# Patient Record
Sex: FEMALE | URBAN - METROPOLITAN AREA
[De-identification: names, ages, dates, MRNs, and addresses within clinical notes are randomized per-mention and may not be internally consistent; named-entity substitution may affect disease eponyms.]

---

## 2020-12-08 ENCOUNTER — OFFICE VISIT (OUTPATIENT)
Dept: OPHTHALMOLOGY | Facility: CLINIC | Age: 65
End: 2020-12-08
Attending: OPHTHALMOLOGY
Payer: COMMERCIAL

## 2020-12-08 DIAGNOSIS — C69.31 MALIGNANT MELANOMA OF CHOROID OF RIGHT EYE (H): ICD-10-CM

## 2020-12-08 DIAGNOSIS — C69.31 MALIGNANT MELANOMA OF CHOROID OF RIGHT EYE (H): Primary | ICD-10-CM

## 2020-12-08 DIAGNOSIS — H35.3221 EXUDATIVE AGE-RELATED MACULAR DEGENERATION OF LEFT EYE WITH ACTIVE CHOROIDAL NEOVASCULARIZATION (H): Primary | ICD-10-CM

## 2020-12-08 PROCEDURE — 92250 FUNDUS PHOTOGRAPHY W/I&R: CPT | Performed by: OPHTHALMOLOGY

## 2020-12-08 PROCEDURE — 92242 FLUORESCEIN&ICG ANGIOGRAPHY: CPT | Performed by: OPHTHALMOLOGY

## 2020-12-08 PROCEDURE — 92004 COMPRE OPH EXAM NEW PT 1/>: CPT | Performed by: OPHTHALMOLOGY

## 2020-12-08 PROCEDURE — 92235 FLUORESCEIN ANGRPH MLTIFRAME: CPT | Performed by: OPHTHALMOLOGY

## 2020-12-08 PROCEDURE — G0463 HOSPITAL OUTPT CLINIC VISIT: HCPCS

## 2020-12-08 PROCEDURE — 99207 FUNDUS PHOTOS OU (BOTH EYES): CPT | Performed by: OPHTHALMOLOGY

## 2020-12-08 PROCEDURE — 76510 OPH US DX B-SCAN&QUAN A-SCAN: CPT | Performed by: OPHTHALMOLOGY

## 2020-12-08 PROCEDURE — 92242 FLUORESCEIN&ICG ANGIOGRAPHY: CPT | Mod: 26 | Performed by: OPHTHALMOLOGY

## 2020-12-08 PROCEDURE — 92134 CPTRZ OPH DX IMG PST SGM RTA: CPT | Performed by: OPHTHALMOLOGY

## 2020-12-08 ASSESSMENT — REFRACTION_WEARINGRX
OS_SPHERE: -2.50
OS_CYLINDER: SPHERE
OD_AXIS: 115
OD_CYLINDER: +1.00
OD_SPHERE: PLANO

## 2020-12-08 ASSESSMENT — EXTERNAL EXAM - LEFT EYE: OS_EXAM: NORMAL

## 2020-12-08 ASSESSMENT — VISUAL ACUITY
OS_CC: 20/20
METHOD: SNELLEN - LINEAR
CORRECTION_TYPE: GLASSES
OD_CC: 20/150

## 2020-12-08 ASSESSMENT — TONOMETRY
IOP_METHOD: TONOPEN
OD_IOP_MMHG: 12
OS_IOP_MMHG: 13

## 2020-12-08 ASSESSMENT — SLIT LAMP EXAM - LIDS
COMMENTS: NORMAL
COMMENTS: NORMAL

## 2020-12-08 ASSESSMENT — CUP TO DISC RATIO
OD_RATIO: 0.1
OS_RATIO: 0.1

## 2020-12-08 ASSESSMENT — EXTERNAL EXAM - RIGHT EYE: OD_EXAM: NORMAL

## 2020-12-08 ASSESSMENT — CONF VISUAL FIELD
OD_SUPERIOR_NASAL_RESTRICTION: 1
OS_NORMAL: 1

## 2020-12-08 NOTE — NURSING NOTE
Chief Complaints and History of Present Illnesses   Patient presents with     Malignant Melanoma (Choroid) Follow Up     Chief Complaint(s) and History of Present Illness(es)     Malignant Melanoma (Choroid) Follow Up     Laterality: right eye    Duration: 6 years              Comments     Pt. States that she is here for Melanoma f/u. Had been seeing Dr. Tipton at  but she has moved. Will be seeing a new Doctor at  but wants to be seen here until then.  No change in VA BE. No pain BE. No flashes or floaters BE.  Latanya Wolfe COT 1:22 PM December 8, 2020

## 2020-12-08 NOTE — PROGRESS NOTES
CC -   CMM OD    INTERVAL HISTORY - last seen by me in 2015.  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 She has been following with Dr Tipton from [facility] for  of the left eye. She is receiving intravitreal injections of Eylea in her right eyr for cystoid macular edema.    Borderline DM    PAST OCULAR SURGERY  I-125 OD 2000  CE/iOL OD    RETINAL IMAGING:  OCT 12-8-20  OD -  Severe atrophic scar, mild CME within scar, mild RPE elevations ?residual CMM  OS -  RPE elevation with mild SRF parafoveal and overlying outer atrophy, RPE outer irregular nasal macula      U/S OD  A-scan - no reliable A-scan  B-scan - thin scar, 1.31 x ? 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 CME   - severe CME 2011 with VA CF@1'   - s/p PST 2013, Lucentis, Avastin, Eylea   - avoiding steroids d/t  OS   - worse CME @ 12 weeks on 6/2018     - last injection Eylea 5/8/20 (7 months)   - OCT no sig fluid   - VA stable   - observe today   - recheck 1-2 months      # 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  I personally obtained the chief complaint(s) and history of present illness.  I confirmed and edited as necessary the review of systems, past medical/surgical history, family history, social history, and examination findings as documented by others; and I examined the patient myself.  I personally reviewed the relevant tests, images, and reports as documented above.  I formulated and edited as necessary the assessment and plan and discussed the findings and management plan with the patient and family    Sondra Reyes MD, PhD  , Vitreoretinal Surgery  Department of Ophthalmology  [facility]

## 2021-01-06 DIAGNOSIS — C69.31 MALIGNANT MELANOMA OF CHOROID OF RIGHT EYE (H): Primary | ICD-10-CM

## 2021-01-11 ENCOUNTER — OFFICE VISIT (OUTPATIENT)
Dept: OPHTHALMOLOGY | Facility: CLINIC | Age: 66
End: 2021-01-11
Attending: OPHTHALMOLOGY
Payer: COMMERCIAL

## 2021-01-11 DIAGNOSIS — H35.3221 EXUDATIVE AGE-RELATED MACULAR DEGENERATION OF LEFT EYE WITH ACTIVE CHOROIDAL NEOVASCULARIZATION (H): Primary | ICD-10-CM

## 2021-01-11 DIAGNOSIS — C69.31 MALIGNANT MELANOMA OF CHOROID OF RIGHT EYE (H): ICD-10-CM

## 2021-01-11 PROCEDURE — G0463 HOSPITAL OUTPT CLINIC VISIT: HCPCS

## 2021-01-11 PROCEDURE — 99213 OFFICE O/P EST LOW 20 MIN: CPT | Performed by: OPHTHALMOLOGY

## 2021-01-11 PROCEDURE — 92134 CPTRZ OPH DX IMG PST SGM RTA: CPT | Performed by: OPHTHALMOLOGY

## 2021-01-11 ASSESSMENT — EXTERNAL EXAM - LEFT EYE: OS_EXAM: NORMAL

## 2021-01-11 ASSESSMENT — SLIT LAMP EXAM - LIDS
COMMENTS: NORMAL
COMMENTS: NORMAL

## 2021-01-11 ASSESSMENT — VISUAL ACUITY
METHOD: SNELLEN - LINEAR
OS_CC: 20/20
CORRECTION_TYPE: GLASSES
OS_CC+: -1
OD_CC: 20/150

## 2021-01-11 ASSESSMENT — REFRACTION_WEARINGRX
OS_CYLINDER: SPHERE
OD_CYLINDER: +1.00
OD_SPHERE: PLANO
OS_SPHERE: -2.50
OD_AXIS: 115

## 2021-01-11 ASSESSMENT — TONOMETRY
IOP_METHOD: TONOPEN
OD_IOP_MMHG: 10
OS_IOP_MMHG: 11

## 2021-01-11 ASSESSMENT — CUP TO DISC RATIO
OD_RATIO: 0.1
OS_RATIO: 0.1

## 2021-01-11 ASSESSMENT — CONF VISUAL FIELD
OS_NORMAL: 1
OD_SUPERIOR_NASAL_RESTRICTION: 1

## 2021-01-11 ASSESSMENT — EXTERNAL EXAM - RIGHT EYE: OD_EXAM: NORMAL

## 2021-01-11 NOTE — PROGRESS NOTES
CC -   CMM OD / PCV OS    INTERVAL HISTORY - LV with me 12/8/20.  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 She has been following with Dr Tipton from [facility] for  of the left eye. She is receiving intravitreal injections of Eylea OD for radiation retinopathy/CME     Borderline DM    PAST OCULAR SURGERY  I-125 OD 2000  CE/iOL OD    RETINAL IMAGING:  OCT 1-11-21  OD -  Severe atrophic scar, mild CME within scar, mild RPE elevations ?residual CMM  OS -  RPE elevation with mild SRF parafoveal and overlying outer atrophy, RPE outer irregular nasal macula      U/S OD  A-scan - no reliable A-scan  B-scan - thin scar, 1.31 x ? 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   - OCT-A & ICG 12/8/20 c/w polyp   - mild SRF nasal macula - noted 1st visit with me 12/2020   - looks chronic    - per notes has come & gone in past with Saeed, noted 5/2020   - continue to observe closely for now     - recheck 4-6 weeks,     - will check Eylea & PDT coverage      # CMM OD   - s/p I-125 2000   - likely inactive   - last U/S 12/2020 @ [facility]    - repeat every 1-2 years   - minimal residual elevation, atrophic scar      # Radiation retinopathy OD with CME   - severe CME 2011 with [facility] CF@1'   - s/p PST 2013, [employer]entis, Avastin, Eylea   - avoiding steroids d/t  OS   - worse CME @ 12 weeks on 6/2018     - last injection Eylea 5/8/20   - OCT no sig fluid   - VA stable   - observe today   - recheck few months            #  OS   - mild fluid, choroid thick for age   - doubt CNVM      # PVD OU   - advised S/Sx RD 12/2020      #  borderline DM no DR          return to clinic:  4-6 weeks  OCT OU, DFE OS    ATTESTATION     Attending Attestation:     Complete documentation of historical and exam elements from today's encounter can be found in the full encounter summary report (not reduplicated in this progress note).  I personally obtained the chief complaint(s) and history of present illness.  I confirmed and edited as necessary the review of systems, past medical/surgical history, family history, social history, and examination findings as documented by others; and I examined the patient myself.  I personally reviewed the relevant tests, images, and reports as documented above.  I formulated and edited as necessary the assessment and plan and discussed the findings and management plan with the patient and family    Sondra Reyes MD, PhD  , Vitreoretinal Surgery  Department of Ophthalmology  [facility]

## 2021-01-11 NOTE — NURSING NOTE
Chief Complaints and History of Present Illnesses   Patient presents with     Macular Degeneration Follow Up     Chief Complaint(s) and History of Present Illness(es)     Macular Degeneration Follow Up     Laterality: both eyes    Onset: 1 month ago              Comments     Pt. States that she is doing well. No change in VA BE. No pain BE. No flashes or floaters BE.   Latanya Wolfe COT 7:45 AM January 11, 2021

## 2021-02-01 DIAGNOSIS — H35.3221 EXUDATIVE AGE-RELATED MACULAR DEGENERATION OF LEFT EYE WITH ACTIVE CHOROIDAL NEOVASCULARIZATION (H): Primary | ICD-10-CM

## 2021-02-08 ENCOUNTER — OFFICE VISIT (OUTPATIENT)
Dept: OPHTHALMOLOGY | Facility: CLINIC | Age: 66
End: 2021-02-08
Attending: OPHTHALMOLOGY
Payer: COMMERCIAL

## 2021-02-08 DIAGNOSIS — H35.3221 EXUDATIVE AGE-RELATED MACULAR DEGENERATION OF LEFT EYE WITH ACTIVE CHOROIDAL NEOVASCULARIZATION (H): ICD-10-CM

## 2021-02-08 PROCEDURE — 99213 OFFICE O/P EST LOW 20 MIN: CPT | Performed by: OPHTHALMOLOGY

## 2021-02-08 PROCEDURE — G0463 HOSPITAL OUTPT CLINIC VISIT: HCPCS

## 2021-02-08 PROCEDURE — 92134 CPTRZ OPH DX IMG PST SGM RTA: CPT | Performed by: OPHTHALMOLOGY

## 2021-02-08 ASSESSMENT — TONOMETRY
IOP_METHOD: TONOPEN
OD_IOP_MMHG: 13
OS_IOP_MMHG: 13

## 2021-02-08 ASSESSMENT — CONF VISUAL FIELD
OD_SUPERIOR_NASAL_RESTRICTION: 1
OS_NORMAL: 1
METHOD: COUNTING FINGERS

## 2021-02-08 ASSESSMENT — EXTERNAL EXAM - LEFT EYE: OS_EXAM: NORMAL

## 2021-02-08 ASSESSMENT — REFRACTION_WEARINGRX
OD_SPHERE: PLANO
OD_CYLINDER: +1.00
OS_CYLINDER: SPHERE
OD_AXIS: 115
OS_SPHERE: -2.50

## 2021-02-08 ASSESSMENT — VISUAL ACUITY
OD_CC+: +1
OD_CC: 20/150
CORRECTION_TYPE: GLASSES
OS_CC: 20/20
METHOD: SNELLEN - LINEAR
OS_CC+: -2

## 2021-02-08 ASSESSMENT — SLIT LAMP EXAM - LIDS
COMMENTS: NORMAL
COMMENTS: NORMAL

## 2021-02-08 ASSESSMENT — EXTERNAL EXAM - RIGHT EYE: OD_EXAM: NORMAL

## 2021-02-08 ASSESSMENT — CUP TO DISC RATIO
OS_RATIO: 0.1
OD_RATIO: 0.1

## 2021-02-08 NOTE — NURSING NOTE
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  Denies any flashes, floaters, pain, or irritation.  Ocular meds: None    Carole Govea OT 7:44 AM February 8, 2021

## 2021-02-08 NOTE — PROGRESS NOTES
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 She has been following with Dr Tipton from [facility] for  of the left eye. She is receiving intravitreal injections of Eylea OD for radiation retinopathy/CME     Borderline DM    PAST OCULAR SURGERY  I-125 OD 2000  CE/iOL OD    RETINAL IMAGING:  OCT 2-8-21  OD -  Severe atrophic scar, mild CME within scar, mild RPE elevations ?residual CMM  OS -  RPE elevation with mild SRF parafoveal and overlying outer atrophy, RPE outer irregular nasal macula      U/S OD  A-scan - no reliable A-scan  B-scan - thin scar, 1.31 x ? 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   - OCT-A & ICG 12/8/20 c/w polyp   - mild SRF nasal macula - noted 1st visit with me 12/2020   - looks chronic    - per notes has come & gone in past with Saeed, noted 5/2020   - continue to observe closely for now     - recheck 4-6 weeks,     - will check Eylea & PDT coverage      # CMM OD   - s/p I-125 2000   - likely inactive   - last U/S 12/2020 @ [facility]    - repeat every 1-2 years   - minimal residual elevation, atrophic scar      # Radiation retinopathy OD with CME   - severe CME 2011 with VA CF@1'   - s/p PST 2013, [employer], Avastin, Eylea   - avoiding steroids d/t  OS   - worse CME @ 12 weeks on 6/2018     - last injection Eylea 5/8/20   - OCT no sig fluid   - VA stable   - observe today   - recheck few months            #  OS   - mild fluid, choroid thick for age   - doubt CNVM      # PVD OU   - advised S/Sx RD 12/2020      # borderline DM no            return to clinic:  6 weeks, OCT OU, DFE OU    ATTESTATION     Attending Attestation:     Complete documentation of historical and exam elements from today's encounter can be found in the full encounter summary report (not reduplicated in this progress note).  I personally obtained the chief complaint(s) and history of present illness.  I confirmed and edited as necessary the review of systems, past medical/surgical history, family history, social history, and examination findings as documented by others; and I examined the patient myself.  I personally reviewed the relevant tests, images, and reports as documented above.  I formulated and edited as necessary the assessment and plan and discussed the findings and management plan with the patient and family    Sondra Reyes MD, PhD  , Vitreoretinal Surgery  Department of Ophthalmology  [facility]

## 2021-03-15 DIAGNOSIS — H35.3221 EXUDATIVE AGE-RELATED MACULAR DEGENERATION OF LEFT EYE WITH ACTIVE CHOROIDAL NEOVASCULARIZATION (H): Primary | ICD-10-CM

## 2021-03-22 ENCOUNTER — OFFICE VISIT (OUTPATIENT)
Dept: OPHTHALMOLOGY | Facility: CLINIC | Age: 66
End: 2021-03-22
Attending: OPHTHALMOLOGY
Payer: COMMERCIAL

## 2021-03-22 DIAGNOSIS — H35.3221 EXUDATIVE AGE-RELATED MACULAR DEGENERATION OF LEFT EYE WITH ACTIVE CHOROIDAL NEOVASCULARIZATION (H): ICD-10-CM

## 2021-03-22 PROCEDURE — 99213 OFFICE O/P EST LOW 20 MIN: CPT | Performed by: OPHTHALMOLOGY

## 2021-03-22 PROCEDURE — G0463 HOSPITAL OUTPT CLINIC VISIT: HCPCS

## 2021-03-22 PROCEDURE — 92134 CPTRZ OPH DX IMG PST SGM RTA: CPT | Performed by: OPHTHALMOLOGY

## 2021-03-22 ASSESSMENT — REFRACTION_WEARINGRX
OS_SPHERE: -2.50
OD_CYLINDER: +1.00
OS_CYLINDER: SPHERE
OD_SPHERE: PLANO
OD_AXIS: 115

## 2021-03-22 ASSESSMENT — CONF VISUAL FIELD
OS_NORMAL: 1
METHOD: COUNTING FINGERS
OD_SUPERIOR_NASAL_RESTRICTION: 1

## 2021-03-22 ASSESSMENT — SLIT LAMP EXAM - LIDS
COMMENTS: NORMAL
COMMENTS: NORMAL

## 2021-03-22 ASSESSMENT — TONOMETRY
IOP_METHOD: TONOPEN
OD_IOP_MMHG: 15
OS_IOP_MMHG: 14

## 2021-03-22 ASSESSMENT — EXTERNAL EXAM - RIGHT EYE: OD_EXAM: NORMAL

## 2021-03-22 ASSESSMENT — CUP TO DISC RATIO
OD_RATIO: 0.1
OS_RATIO: 0.1

## 2021-03-22 ASSESSMENT — VISUAL ACUITY
OS_CC: 20/20
OD_CC: 20/150
METHOD: SNELLEN - LINEAR
CORRECTION_TYPE: GLASSES

## 2021-03-22 ASSESSMENT — EXTERNAL EXAM - LEFT EYE: OS_EXAM: NORMAL

## 2021-03-22 NOTE — PROGRESS NOTES
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 She has been following with Dr Tipton from [facility] for  of the left eye. She is receiving intravitreal injections of Eylea OD for radiation retinopathy/CME     Borderline DM    PAST OCULAR SURGERY  I-125 OD 2000  CE/iOL OD    RETINAL IMAGING:  OCT 3-22-21  OD -  Severe atrophic scar, mild CME within scar, mild RPE elevations ?residual CMM  OS -  RPE elevation with mild SRF parafoveal and overlying outer atrophy, RPE outer irregular nasal macula - stable      U/S OD  A-scan - no reliable A-scan  B-scan - thin scar, 1.31 x ? 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   - OCT-A & ICG 12/8/20 c/w polyp   - mild SRF nasal macula - noted 1st visit with me 12/2020   - looks chronic    - per notes has come & gone in past with Saeed, noted 5/2020   - continue to observe closely for now     - recheck 6-8  weeks,   - Eylea & PDT are covered (3/2021)      # CMM OD   - s/p I-125 2000   - likely inactive   - last U/S 12/2020 @ [facility]    - repeat every 1-2 years   - minimal residual elevation, atrophic scar      # Radiation retinopathy OD with CME   - severe CME 2011 with VA CF@1'   - s/p PST 2013, Lucentis, Avastin, Eylea   - avoiding steroids d/t  OS   - worse CME @ 12 weeks on 6/2018     - last injection Eylea 5/8/20   - OCT no sig fluid   - VA stable   - observe today          #  OS   - mild fluid, choroid thick for age   - doubt CNVM      # PVD OU   - advised S/Sx RD 12/2020      # borderline DM no DR basilio  to clinic:  6-8 weeks, OCT OU, DFE OU    ATTESTATION     Attending Attestation:     Complete documentation of historical and exam elements from today's encounter can be found in the full encounter summary report (not reduplicated in this progress note).  I personally obtained the chief complaint(s) and history of present illness.  I confirmed and edited as necessary the review of systems, past medical/surgical history, family history, social history, and examination findings as documented by others; and I examined the patient myself.  I personally reviewed the relevant tests, images, and reports as documented above.  I formulated and edited as necessary the assessment and plan and discussed the findings and management plan with the patient and family    Sondra Reyes MD, PhD  , Vitreoretinal Surgery  Department of Ophthalmology  [facility]

## 2021-03-22 NOTE — NURSING NOTE
Chief Complaints and History of Present Illnesses   Patient presents with     Follow Up     Exudative age-related macular degeneration of left eye with active choroidal neovascularization     Chief Complaint(s) and History of Present Illness(es)     Follow Up     Laterality: left eye    Course: stable    Associated symptoms: Negative for dryness, eye pain, flashes and floaters    Treatments tried: no treatments    Pain scale: 0/10    Comments: Exudative age-related macular degeneration of left eye with active choroidal neovascularization              Comments     She states that her vision has seemed stable in both eyes since her last eye exam.  Patient denies having any eye discomfort.    Brenda Almanza, COT 7:41 AM  March 22, 2021

## 2021-04-19 DIAGNOSIS — H35.3221 EXUDATIVE AGE-RELATED MACULAR DEGENERATION OF LEFT EYE WITH ACTIVE CHOROIDAL NEOVASCULARIZATION (H): Primary | ICD-10-CM

## 2021-06-11 ENCOUNTER — OFFICE VISIT (OUTPATIENT)
Dept: OPHTHALMOLOGY | Facility: CLINIC | Age: 66
End: 2021-06-11
Attending: OPHTHALMOLOGY
Payer: COMMERCIAL

## 2021-06-11 DIAGNOSIS — H35.3221 EXUDATIVE AGE-RELATED MACULAR DEGENERATION OF LEFT EYE WITH ACTIVE CHOROIDAL NEOVASCULARIZATION (H): ICD-10-CM

## 2021-06-11 PROCEDURE — 99213 OFFICE O/P EST LOW 20 MIN: CPT | Performed by: OPHTHALMOLOGY

## 2021-06-11 PROCEDURE — 92134 CPTRZ OPH DX IMG PST SGM RTA: CPT | Performed by: OPHTHALMOLOGY

## 2021-06-11 PROCEDURE — G0463 HOSPITAL OUTPT CLINIC VISIT: HCPCS

## 2021-06-11 ASSESSMENT — TONOMETRY
OD_IOP_MMHG: 14
IOP_METHOD: TONOPEN
OS_IOP_MMHG: 18

## 2021-06-11 ASSESSMENT — EXTERNAL EXAM - RIGHT EYE: OD_EXAM: NORMAL

## 2021-06-11 ASSESSMENT — SLIT LAMP EXAM - LIDS
COMMENTS: NORMAL
COMMENTS: NORMAL

## 2021-06-11 ASSESSMENT — VISUAL ACUITY
OD_CC: 20/150
CORRECTION_TYPE: GLASSES
OS_CC+: -1
METHOD: SNELLEN - LINEAR
OS_CC: 20/20

## 2021-06-11 ASSESSMENT — REFRACTION_WEARINGRX
OS_CYLINDER: SPHERE
OD_AXIS: 115
OD_SPHERE: PLANO
OS_SPHERE: -2.50
OD_CYLINDER: +1.00

## 2021-06-11 ASSESSMENT — CONF VISUAL FIELD
OD_SUPERIOR_NASAL_RESTRICTION: 3
OS_NORMAL: 1
METHOD: COUNTING FINGERS

## 2021-06-11 ASSESSMENT — EXTERNAL EXAM - LEFT EYE: OS_EXAM: NORMAL

## 2021-06-11 ASSESSMENT — CUP TO DISC RATIO
OD_RATIO: 0.1
OS_RATIO: 0.1

## 2021-06-11 NOTE — NURSING NOTE
Chief Complaints and History of Present Illnesses   Patient presents with     Follow Up     Exudative age-related macular degeneration of left eye with active choroidal neovascularization     Chief Complaint(s) and History of Present Illness(es)     Follow Up     Laterality: left eye    Course: stable    Associated symptoms: Negative for floaters, flashes, dryness and eye pain    Treatments tried: no treatments    Pain scale: 0/10    Comments: Exudative age-related macular degeneration of left eye with active choroidal neovascularization              Comments     She states that her vision has seemed stable in both eyes since her last eye exam.  Patient denies having any eye discomfort.    Brenda Almanza, COT 9:32 AM  June 11, 2021

## 2021-06-11 NOTE — PROGRESS NOTES
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 She has been following with Dr Tipton from [facility] for  of the left eye. She is receiving intravitreal injections of Eylea OD for radiation retinopathy/CME     Borderline DM    PAST OCULAR SURGERY  I-125 OD 2000  CE/iOL OD    RETINAL IMAGING:  OCT 6-11-21  OD -  Severe atrophic scar, mild CME within scar, mild RPE elevations ?residual CMM  OS -  RPE elevation with mild SRF parafoveal and overlying outer atrophy, RPE outer irregular nasal macula - stable      U/S OD  A-scan - no reliable A-scan  B-scan - thin scar, 1.31 x ? 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   - OCT-A & ICG 12/8/20 c/w polyp   - mild SRF nasal macula - noted 1st visit with me 12/2020   - looks chronic    - per notes has come & gone in past with Saeed, noted 5/2020     - today stable on OCT + DFE   - continue to observe closely for now     - recheck 2 months   - Eylea & PDT are covered (3/2021)      # CMM OD   - s/p I-125 2000   - likely inactive   - last U/S 12/2020 @ [facility]    - repeat every 1-2 years   - minimal residual elevation, atrophic scar      # Radiation retinopathy OD with CME   - severe CME 2011 with VA CF@1'   - s/p PST 2013, Lucentis, Avastin, Eylea   - avoiding steroids d/t  OS   - worse CME @ 12 weeks on 6/2018     - last injection Eylea 5/8/20   - OCT no sig fluid   - VA stable   - observe today          #  OS   - mild fluid, choroid thick for age   - doubt CNVM      # PVD OU   - advised S/Sx RD  12/2020      # borderline DM no DR          return to clinic:  2 month  OCT OU, DFE OU    ATTESTATION     Attending Attestation:     Complete documentation of historical and exam elements from today's encounter can be found in the full encounter summary report (not reduplicated in this progress note).  I personally obtained the chief complaint(s) and history of present illness.  I confirmed and edited as necessary the review of systems, past medical/surgical history, family history, social history, and examination findings as documented by others; and I examined the patient myself.  I personally reviewed the relevant tests, images, and reports as documented above.  I formulated and edited as necessary the assessment and plan and discussed the findings and management plan with the patient and family    Sondra Reyes MD, PhD  , Vitreoretinal Surgery  Department of Ophthalmology  [facility]

## 2021-08-02 DIAGNOSIS — H35.3221 EXUDATIVE AGE-RELATED MACULAR DEGENERATION OF LEFT EYE WITH ACTIVE CHOROIDAL NEOVASCULARIZATION (H): Primary | ICD-10-CM

## 2021-08-13 ENCOUNTER — OFFICE VISIT (OUTPATIENT)
Dept: OPHTHALMOLOGY | Facility: CLINIC | Age: 66
End: 2021-08-13
Attending: OPHTHALMOLOGY
Payer: COMMERCIAL

## 2021-08-13 DIAGNOSIS — H35.3221 EXUDATIVE AGE-RELATED MACULAR DEGENERATION OF LEFT EYE WITH ACTIVE CHOROIDAL NEOVASCULARIZATION (H): ICD-10-CM

## 2021-08-13 PROCEDURE — G0463 HOSPITAL OUTPT CLINIC VISIT: HCPCS

## 2021-08-13 PROCEDURE — 99214 OFFICE O/P EST MOD 30 MIN: CPT | Performed by: OPHTHALMOLOGY

## 2021-08-13 PROCEDURE — 92134 CPTRZ OPH DX IMG PST SGM RTA: CPT | Performed by: OPHTHALMOLOGY

## 2021-08-13 ASSESSMENT — VISUAL ACUITY
METHOD: SNELLEN - LINEAR
OD_CC: 20/125
OD_CC+: -1
OS_CC: 20/25

## 2021-08-13 ASSESSMENT — CUP TO DISC RATIO
OS_RATIO: 0.1
OD_RATIO: 0.1

## 2021-08-13 ASSESSMENT — CONF VISUAL FIELD
OS_NORMAL: 1
OD_SUPERIOR_NASAL_RESTRICTION: 3
METHOD: COUNTING FINGERS

## 2021-08-13 ASSESSMENT — EXTERNAL EXAM - LEFT EYE: OS_EXAM: NORMAL

## 2021-08-13 ASSESSMENT — TONOMETRY
IOP_METHOD: TONOPEN
OD_IOP_MMHG: 14
OS_IOP_MMHG: 15

## 2021-08-13 ASSESSMENT — REFRACTION_WEARINGRX
OS_SPHERE: -2.50
OD_SPHERE: PLANO
OD_CYLINDER: +1.00
OD_AXIS: 115
OS_CYLINDER: SPHERE

## 2021-08-13 ASSESSMENT — EXTERNAL EXAM - RIGHT EYE: OD_EXAM: NORMAL

## 2021-08-13 ASSESSMENT — SLIT LAMP EXAM - LIDS
COMMENTS: NORMAL
COMMENTS: NORMAL

## 2021-08-13 NOTE — NURSING NOTE
Chief Complaints and History of Present Illnesses   Patient presents with     Exudative Macular Degeneration Follow Up     Chief Complaint(s) and History of Present Illness(es)     Exudative Macular Degeneration Follow Up     Laterality: left eye    Onset: gradual    Severity: moderate    Frequency: constantly    Timing: throughout the day    Associated symptoms: Negative for eye pain, floaters and flashes    Response to treatment: no improvement    Pain scale: 0/10              Comments     Arlen is here to continue care for Exudative age-related macular degeneration of left eye with active choroidal neovascularization. For the past week, she has been seeing distortion line in her vision off and on. Not sure which eye.     Asael Thornton COT 9:17 AM August 13, 2021

## 2021-08-13 NOTE — PROGRESS NOTES
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 She has been following with Dr Tipton from [facility] for  of the left eye. She is receiving intravitreal injections of Eylea OD for radiation retinopathy/CME     Borderline DM    PAST OCULAR SURGERY  I-125 OD 2000  CE/IOL OD    RETINAL IMAGING:  OCT 8-13-21  OD -  Severe atrophic scar, mild CME within scar, mild RPE elevations ?residual CMM  OS -  RPE elevation with mild SRF parafoveal and overlying outer atrophy, RPE outer irregular nasal macula - ?better      U/S OD  A-scan - no reliable A-scan  B-scan - thin scar, 1.31 x ? 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   - OCT-A & ICG 12/8/20 c/w polyp   - mild SRF nasal macula   - per notes has come & gone in past with Saeed, noted 5/2020   -  noted 1st visit with me 12/2020   - looks chronic        - today stable on OCT + DFE vs better   - continue to observe closely for now     - recheck 2-3  months   - Eylea & PDT are covered (3/2021)      # CMM OD   - s/p I-125 2000   - likely inactive   - last U/S 12/2020 @ [facility]    - repeat every 1-2 years   - minimal residual elevation, atrophic scar      # Radiation retinopathy OD with CME   - severe CME 2011 with VA CF@1'   - s/p PST 2013, [employer]entis, Avastin, Eylea   - avoiding steroids d/t  OS   - worse CME @ 12 weeks on 6/2018     - last injection Eylea 5/8/20   - OCT no sig fluid   - VA stable   - observe today          #  OS   - mild fluid, choroid thick for age   - doubt CNVM      # PVD  OU   - advised S/Sx RD 8/2021      # borderline DM no DR          return to clinic:  2  month  OCT OU, DFE OU    ATTESTATION     Attending Attestation:     Complete documentation of historical and exam elements from today's encounter can be found in the full encounter summary report (not reduplicated in this progress note).  I personally obtained the chief complaint(s) and history of present illness.  I confirmed and edited as necessary the review of systems, past medical/surgical history, family history, social history, and examination findings as documented by others; and I examined the patient myself.  I personally reviewed the relevant tests, images, and reports as documented above.  I formulated and edited as necessary the assessment and plan and discussed the findings and management plan with the patient and family    Sondra Reyes MD, PhD  , Vitreoretinal Surgery  Department of Ophthalmology  [facility]

## 2021-09-20 DIAGNOSIS — H35.3221 EXUDATIVE AGE-RELATED MACULAR DEGENERATION OF LEFT EYE WITH ACTIVE CHOROIDAL NEOVASCULARIZATION (H): Primary | ICD-10-CM

## 2021-11-05 ENCOUNTER — OFFICE VISIT (OUTPATIENT)
Dept: OPHTHALMOLOGY | Facility: CLINIC | Age: 66
End: 2021-11-05
Attending: OPHTHALMOLOGY
Payer: COMMERCIAL

## 2021-11-05 DIAGNOSIS — H35.3221 EXUDATIVE AGE-RELATED MACULAR DEGENERATION OF LEFT EYE WITH ACTIVE CHOROIDAL NEOVASCULARIZATION (H): ICD-10-CM

## 2021-11-05 PROCEDURE — 99213 OFFICE O/P EST LOW 20 MIN: CPT | Mod: GC | Performed by: OPHTHALMOLOGY

## 2021-11-05 PROCEDURE — 92134 CPTRZ OPH DX IMG PST SGM RTA: CPT | Performed by: OPHTHALMOLOGY

## 2021-11-05 PROCEDURE — G0463 HOSPITAL OUTPT CLINIC VISIT: HCPCS

## 2021-11-05 ASSESSMENT — REFRACTION_WEARINGRX
OS_CYLINDER: SPHERE
OS_SPHERE: -2.50
OD_SPHERE: PLANO
OD_CYLINDER: +1.00
OD_AXIS: 115

## 2021-11-05 ASSESSMENT — CUP TO DISC RATIO
OS_RATIO: 0.1
OD_RATIO: 0.1

## 2021-11-05 ASSESSMENT — TONOMETRY
IOP_METHOD: TONOPEN
OS_IOP_MMHG: 15
OD_IOP_MMHG: 13

## 2021-11-05 ASSESSMENT — VISUAL ACUITY
METHOD: SNELLEN - LINEAR
OD_CC: 20/150
OS_CC: 20/20
OS_CC+: -2
CORRECTION_TYPE: GLASSES

## 2021-11-05 ASSESSMENT — CONF VISUAL FIELD
OS_NORMAL: 1
METHOD: COUNTING FINGERS
OD_SUPERIOR_NASAL_RESTRICTION: 3

## 2021-11-05 ASSESSMENT — SLIT LAMP EXAM - LIDS
COMMENTS: NORMAL
COMMENTS: NORMAL

## 2021-11-05 ASSESSMENT — EXTERNAL EXAM - RIGHT EYE: OD_EXAM: NORMAL

## 2021-11-05 ASSESSMENT — EXTERNAL EXAM - LEFT EYE: OS_EXAM: NORMAL

## 2021-11-05 NOTE — PROGRESS NOTES
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 She has been following with Dr Tipton from [facility] for  of the left eye. She is receiving intravitreal injections of Eylea OD for radiation retinopathy/CME     Borderline DM    PAST OCULAR SURGERY  I-125 OD 2000  CE/IOL OD    RETINAL IMAGING:    OCT 11/5/21  OD -  Severe atrophic scar, mild CME within scar, mild RPE elevations elevation IT contains reflective nunu-like opacities, PVD.   OS -  RPE elevation with mild SRF parafoveal and overlying outer atrophy, less fluid & lower RPE elevation, PVD      U/S OD  A-scan - no reliable A-scan  B-scan - thin scar, 1.31 x ? 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   - OCT-A & ICG 12/8/20 c/w polyp   - mild SRF nasal macula   - per notes has come & gone in past with Saeed, noted 5/2020   -  noted 1st visit with me 12/2020   - looks chronic        - today stable on OCT + DFE vs better   - continue to observe closely for now     - recheck 2-3  months   - Eylea & PDT are covered (3/2021)      # CMM OD   - s/p I-125 2000   - likely inactive   - last U/S 12/2020 @ [facility]    - repeat every 1-2 years   - minimal residual elevation, atrophic scar      # Radiation retinopathy OD with CME   - severe CME 2011 with VA CF@1'   - s/p PST 2013, Lucentis, Avastin, Eylea   - avoiding steroids d/t  OS   - worse CME @ 12 weeks on 6/2018     - last injection Eylea 5/8/20   - OCT no sig fluid   - VA stable   - observe today      #  OS   - mild fluid, choroid  thick for age   - doubt CNVM      # PVD OU   - advised S/Sx RD 8/2021      # borderline DM no DR   - pt lost weight and has normal blood sugars now      return to clinic:  2  month  OCT OU, DFE each eye    Moise Beaver MD  Retina Fellow, PGY5      ATTESTATION     Attending Attestation:     Complete documentation of historical and exam elements from today's encounter can be found in the full encounter summary report (not reduplicated in this progress note).  I personally obtained the chief complaint(s) and history of present illness.  I confirmed and edited as necessary the review of systems, past medical/surgical history, family history, social history, and examination findings as documented by others; and I examined the patient myself.  I personally reviewed the relevant tests, images, and reports as documented above.  I personally reviewed the ophthalmic test(s) associated with this encounter, agree with the interpretation(s) as documented by the resident/fellow, and have edited the corresponding report(s) as necessary.   I formulated and edited as necessary the assessment and plan and discussed the findings and management plan with the patient and family    Sondra Reyes MD, PhD  , Vitreoretinal Surgery  Department of Ophthalmology  [facility]

## 2021-11-05 NOTE — NURSING NOTE
Chief Complaints and History of Present Illnesses   Patient presents with     Follow Up     Exudative age-related macular degeneration of left eye with active choroidal neovascularization      Chief Complaint(s) and History of Present Illness(es)     Follow Up     Laterality: left eye    Course: stable    Associated symptoms: Negative for eye pain, redness, tearing and dryness    Treatments tried: no treatments    Pain scale: 0/10    Comments: Exudative age-related macular degeneration of left eye with active choroidal neovascularization               Comments     She states that her vision has seemed stable in both eyes since her last eye exam.  Patient denies having any eye discomfort.    RUBÉN Christopher 7:42 AM  November 5, 2021

## 2022-02-07 ENCOUNTER — OFFICE VISIT (OUTPATIENT)
Dept: OPHTHALMOLOGY | Facility: CLINIC | Age: 67
End: 2022-02-07
Attending: OPHTHALMOLOGY
Payer: COMMERCIAL

## 2022-02-07 DIAGNOSIS — H35.3221 EXUDATIVE AGE-RELATED MACULAR DEGENERATION OF LEFT EYE WITH ACTIVE CHOROIDAL NEOVASCULARIZATION (H): ICD-10-CM

## 2022-02-07 DIAGNOSIS — H35.3221 EXUDATIVE AGE-RELATED MACULAR DEGENERATION OF LEFT EYE WITH ACTIVE CHOROIDAL NEOVASCULARIZATION (H): Primary | ICD-10-CM

## 2022-02-07 PROCEDURE — G0463 HOSPITAL OUTPT CLINIC VISIT: HCPCS | Mod: 25

## 2022-02-07 PROCEDURE — 92134 CPTRZ OPH DX IMG PST SGM RTA: CPT | Performed by: OPHTHALMOLOGY

## 2022-02-07 PROCEDURE — 99213 OFFICE O/P EST LOW 20 MIN: CPT | Performed by: OPHTHALMOLOGY

## 2022-02-07 ASSESSMENT — TONOMETRY
OD_IOP_MMHG: 16
IOP_METHOD: TONOPEN
OS_IOP_MMHG: 15

## 2022-02-07 ASSESSMENT — CONF VISUAL FIELD
OD_SUPERIOR_NASAL_RESTRICTION: 3
METHOD: COUNTING FINGERS
OS_NORMAL: 1

## 2022-02-07 ASSESSMENT — EXTERNAL EXAM - LEFT EYE: OS_EXAM: NORMAL

## 2022-02-07 ASSESSMENT — VISUAL ACUITY
OD_CC: 20/150
OD_CC+: +1
METHOD: SNELLEN - LINEAR
OS_CC: 20/20
CORRECTION_TYPE: GLASSES

## 2022-02-07 ASSESSMENT — CUP TO DISC RATIO
OD_RATIO: 0.1
OS_RATIO: 0.1

## 2022-02-07 ASSESSMENT — EXTERNAL EXAM - RIGHT EYE: OD_EXAM: NORMAL

## 2022-02-07 ASSESSMENT — SLIT LAMP EXAM - LIDS
COMMENTS: NORMAL
COMMENTS: NORMAL

## 2022-02-07 NOTE — PROGRESS NOTES
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 She has been following with Dr Tipton from [facility] for  of the left eye. She is receiving intravitreal injections of Eylea OD for radiation retinopathy/CME     Borderline DM    PAST OCULAR SURGERY  I-125 OD 2000  CE/IOL OD    RETINAL IMAGING:    OCT 2-7-22   OD -  Severe atrophic scar, mild CME within scar, mild RPE elevations elevation IT contains reflective nunu-like opacities, PVD.   OS -  RPE elevation with mild SRF parafoveal and overlying outer atrophy, tr fluid & stable RPE elevation, PVD      U/S OD  A-scan - no reliable A-scan  B-scan - thin scar, 1.31 x ? 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   - OCT-A & ICG 12/8/20 c/w polyp   - mild SRF nasal macula   - per notes has come & gone in past with Saeed, noted 5/2020   -  noted 1st visit with me 12/2020   - looks chronic      - Eylea & PDT are covered (3/2021)   - AREDS (2/2022)         - today stable on OCT + DFE vs better   - continue to observe closely for now     - recheck 2-3  months      # CMM OD   - s/p I-125 2000   - likely inactive   - last U/S 12/2020 @ [facility]    - repeat every 1-2 years   - minimal residual elevation, atrophic scar      # Radiation retinopathy OD with CME   - severe CME 2011 with VA CF@1'   - s/p PST 2013, Lucentis, Avastin, Eylea   - avoiding steroids d/t  OS   - worse CME @ 12 weeks on 6/2018     - last injection Eylea 5/8/20   - OCT no sig fluid   - VA stable   - observe today      #   OS   - mild fluid, choroid thick for age   - doubt CNVM      # PVD OU   - advised S/Sx RD 8/2021      # borderline DM no DR   - pt lost weight and has normal blood sugars now      return to clinic:  3  month  OCT OU, DFE each eye      ATTESTATION     Attending Attestation:     Complete documentation of historical and exam elements from today's encounter can be found in the full encounter summary report (not reduplicated in this progress note).  I personally obtained the chief complaint(s) and history of present illness.  I confirmed and edited as necessary the review of systems, past medical/surgical history, family history, social history, and examination findings as documented by others; and I examined the patient myself.  I personally reviewed the relevant tests, images, and reports as documented above.  I formulated and edited as necessary the assessment and plan and discussed the findings and management plan with the patient and family    Sondra Reyes MD, PhD  , Vitreoretinal Surgery  Department of Ophthalmology  [facility]

## 2022-02-07 NOTE — NURSING NOTE
Chief Complaints and History of Present Illnesses   Patient presents with     Macular Degeneration Follow Up     Chief Complaint(s) and History of Present Illness(es)     Macular Degeneration Follow Up     Laterality: both eyes    Quality: blurred vision    Frequency: constantly    Timing: throughout the day    Course: stable    Associated symptoms: Negative for eye pain, floaters, flashes, fever and itching    Pain scale: 0/10              Comments     AMD LE and follow up for hx: CMM RE  Pt states she started taking Preservision, BID. Started about 10 days ago.  Pt states VA seems unchanged.  Demi Ayala, COT COT 9:17 AM 02/07/2022

## 2022-05-17 DIAGNOSIS — H35.3221 EXUDATIVE AGE-RELATED MACULAR DEGENERATION OF LEFT EYE WITH ACTIVE CHOROIDAL NEOVASCULARIZATION (H): Primary | ICD-10-CM

## 2022-05-27 ENCOUNTER — OFFICE VISIT (OUTPATIENT)
Dept: OPHTHALMOLOGY | Facility: CLINIC | Age: 67
End: 2022-05-27
Attending: OPHTHALMOLOGY
Payer: COMMERCIAL

## 2022-05-27 DIAGNOSIS — H35.3221 EXUDATIVE AGE-RELATED MACULAR DEGENERATION OF LEFT EYE WITH ACTIVE CHOROIDAL NEOVASCULARIZATION (H): ICD-10-CM

## 2022-05-27 PROCEDURE — 92134 CPTRZ OPH DX IMG PST SGM RTA: CPT | Performed by: OPHTHALMOLOGY

## 2022-05-27 PROCEDURE — 99213 OFFICE O/P EST LOW 20 MIN: CPT | Mod: GC | Performed by: OPHTHALMOLOGY

## 2022-05-27 PROCEDURE — G0463 HOSPITAL OUTPT CLINIC VISIT: HCPCS | Mod: 25

## 2022-05-27 ASSESSMENT — TONOMETRY
OS_IOP_MMHG: 17
IOP_METHOD: TONOPEN
OD_IOP_MMHG: 16

## 2022-05-27 ASSESSMENT — REFRACTION_WEARINGRX
OD_CYLINDER: +1.00
OD_AXIS: 115
OS_CYLINDER: SPHERE
OS_SPHERE: -2.50
OD_SPHERE: PLANO

## 2022-05-27 ASSESSMENT — SLIT LAMP EXAM - LIDS
COMMENTS: NORMAL
COMMENTS: NORMAL

## 2022-05-27 ASSESSMENT — CUP TO DISC RATIO
OD_RATIO: 0.1
OS_RATIO: 0.1

## 2022-05-27 ASSESSMENT — CONF VISUAL FIELD
OD_SUPERIOR_NASAL_RESTRICTION: 1
OS_NORMAL: 1

## 2022-05-27 ASSESSMENT — VISUAL ACUITY
CORRECTION_TYPE: GLASSES
OS_CC: 20/20
METHOD: SNELLEN - LINEAR
OD_CC: 20/100

## 2022-05-27 ASSESSMENT — EXTERNAL EXAM - RIGHT EYE: OD_EXAM: NORMAL

## 2022-05-27 ASSESSMENT — EXTERNAL EXAM - LEFT EYE: OS_EXAM: NORMAL

## 2022-05-27 NOTE — PROGRESS NOTES
CC -   CMM OD / PCV OS    INTERVAL HISTORY -   No changes since NKECHI 2/2022, taking AREDS, no changes on Amsler.    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 She has been following with Dr Tipton from [facility] for  of the left eye. She is receiving intravitreal injections of Eylea OD for radiation retinopathy/CME     Borderline DM    PAST OCULAR SURGERY  I-125 OD 2000  CE/IOL OD    RETINAL IMAGING:    OCT 5-27-22   OD -  Severe atrophic scar, mild CME within scar, mild RPE elevations elevation IT contains reflective nunu-like opacities, PVD. Largely Stable.  OS - juxtafoveal FVPED incr size no fluid, peripapillary FVPED no fluid, PVD - ?worse    U/S OD  A-scan - no reliable A-scan  B-scan - thin scar, 1.31 x ? 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   - OCT-A & ICG 12/8/20 c/w polyp   - mild SRF & FVPED nasal juxtafoveal    - per notes has come & gone in past with Saeed, noted 5/2020   -  noted 1st visit with me 12/2020   - looks chronic       - no fluid on 2/2022, incr FVPED 5/2022     - Eylea & PDT are covered (3/2021)     - today juxtafoveal FVPED larger   - no fluid   - d/w patient Tx vs observe, explained risk of permanent vision loss with activiation,   - wishes to observe   - advised to come in ASAP if any vision change noted      - continue to observe closely for now    - recheck 2-3  months      # CMM OD   - s/p I-125 2000   - likely inactive   - last U/S 12/2020 @ [facility]    - repeat every 1-2 years next visit    - minimal residual elevation, atrophic scar      # Radiation retinopathy OD with CME   -  severe CME 2011 with VA CF@1'   - s/p PST 2013, Lucentis, Avastin, Eylea   - avoiding steroids d/t  OS   - worse CME @ 12 weeks on 6/2018     - last injection Eylea 5/8/20   - OCT no sig fluid   - VA stable   - observe today      #  OS   - mild fluid, choroid thick for age   - doubt CNVM      # PVD OU   - advised S/Sx RD 5/2022      # borderline DM no DR   - pt lost weight and has normal blood sugars now      return to clinic:  3  month  OCT OU, DFE each eye    Sindy Chan MD  Ophthalmology Resident, PGY-3  [facility]         ATTESTATION     Attending Attestation:     Complete documentation of historical and exam elements from today's encounter can be found in the full encounter summary report (not reduplicated in this progress note).  I personally obtained the chief complaint(s) and history of present illness.  I confirmed and edited as necessary the review of systems, past medical/surgical history, family history, social history, and examination findings as documented by others; and I examined the patient myself.  I personally reviewed the relevant tests, images, and reports as documented above.  I personally reviewed the ophthalmic test(s) associated with this encounter, agree with the interpretation(s) as documented by the resident/fellow, and have edited the corresponding report(s) as necessary.   I formulated and edited as necessary the assessment and plan and discussed the findings and management plan with the patient and family    Sondra Reyes MD, PhD  , Vitreoretinal Surgery  Department of Ophthalmology  [facility]

## 2022-08-18 DIAGNOSIS — H35.3221 EXUDATIVE AGE-RELATED MACULAR DEGENERATION OF LEFT EYE WITH ACTIVE CHOROIDAL NEOVASCULARIZATION (H): Primary | ICD-10-CM

## 2022-08-29 ENCOUNTER — OFFICE VISIT (OUTPATIENT)
Dept: OPHTHALMOLOGY | Facility: CLINIC | Age: 67
End: 2022-08-29
Attending: OPHTHALMOLOGY
Payer: COMMERCIAL

## 2022-08-29 DIAGNOSIS — H35.3221 EXUDATIVE AGE-RELATED MACULAR DEGENERATION OF LEFT EYE WITH ACTIVE CHOROIDAL NEOVASCULARIZATION (H): ICD-10-CM

## 2022-08-29 PROCEDURE — 92134 CPTRZ OPH DX IMG PST SGM RTA: CPT | Mod: 26 | Performed by: STUDENT IN AN ORGANIZED HEALTH CARE EDUCATION/TRAINING PROGRAM

## 2022-08-29 PROCEDURE — G0463 HOSPITAL OUTPT CLINIC VISIT: HCPCS | Mod: 25

## 2022-08-29 PROCEDURE — 92134 CPTRZ OPH DX IMG PST SGM RTA: CPT | Performed by: OPHTHALMOLOGY

## 2022-08-29 PROCEDURE — 99213 OFFICE O/P EST LOW 20 MIN: CPT | Mod: GC | Performed by: STUDENT IN AN ORGANIZED HEALTH CARE EDUCATION/TRAINING PROGRAM

## 2022-08-29 RX ORDER — VIT C/B6/B5/MAGNESIUM/HERB 173 50-5-6-5MG
1 CAPSULE ORAL 2 TIMES DAILY
COMMUNITY

## 2022-08-29 ASSESSMENT — CUP TO DISC RATIO
OS_RATIO: 0.1
OD_RATIO: 0.1

## 2022-08-29 ASSESSMENT — VISUAL ACUITY
OS_CC: 20/20
OD_CC+: -1
OS_CC+: -2
METHOD: SNELLEN - LINEAR
OD_CC: 20/125

## 2022-08-29 ASSESSMENT — REFRACTION_WEARINGRX
OD_SPHERE: PLANO
OD_CYLINDER: +1.00
OS_CYLINDER: SPHERE
OS_SPHERE: -2.50
OD_AXIS: 115

## 2022-08-29 ASSESSMENT — EXTERNAL EXAM - LEFT EYE: OS_EXAM: NORMAL

## 2022-08-29 ASSESSMENT — EXTERNAL EXAM - RIGHT EYE: OD_EXAM: NORMAL

## 2022-08-29 ASSESSMENT — TONOMETRY
IOP_METHOD: TONOPEN
OS_IOP_MMHG: 15
OD_IOP_MMHG: 15

## 2022-08-29 ASSESSMENT — CONF VISUAL FIELD
OD_SUPERIOR_NASAL_RESTRICTION: 1
OS_NORMAL: 1

## 2022-08-29 ASSESSMENT — SLIT LAMP EXAM - LIDS
COMMENTS: NORMAL
COMMENTS: NORMAL

## 2022-08-29 NOTE — PROGRESS NOTES
CC -   CMM OD / PCV OS    INTERVAL HISTORY -   No questions or concerns since NKECHI 2/2022, no changes on Amsler.    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 She has been following with Dr Tipton from [facility] for  of the left eye. She is receiving intravitreal injections of Eylea OD for radiation retinopathy/CME     Borderline DM    PAST OCULAR SURGERY  I-125 OD 2000  CE/IOL OD    RETINAL IMAGING:  OCT 08/29/22    OD -  Severe central, large atrophic scar, mild CME at temporal edge of scar and associated RPE elevations at nasal edge, central IT elevation contains stable reflective nunu-like opacities, PVD. - Stable  OS - juxtafoveal FVPED is stable, foveal contour present, PED nasal to fovea with RPE migration, no IRF, no SRF, choroid thick subfoveal, size no fluid, peripapillary FVPED no fluid, PVD - ?worse    U/S OD  A-scan - no reliable A-scan  B-scan - thin scar, 1.31 x ? 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   - OCT-A & ICG 12/8/20 c/w polyp   - mild SRF & FVPED nasal juxtafoveal    - per notes has come & gone in past with Saeed, noted 5/2020   -  noted 1st visit with me 12/2020   - OCT-A 8/2022 c/w CNVM     - no fluid on 2/2022, incr FVPED 5/2022 decr 8/2022     - today juxtafoveal FVPED better   - no fluid   - continue to observe q3-4 months (prefer 3)   - patient will try to schedule at [facility]      # CMM OD   - s/p I-125 2000   - likely inactive   - last U/S 12/2020 @ [facility]    - repeat every 1-2 years next visit    - minimal residual elevation, atrophic scar      # Radiation retinopathy OD with CME   -  severe CME 2011 with VA CF@1'   - s/p PST 2013, Lucentis, Avastin, Eylea   - avoiding steroids d/t  OS   - worse CME @ 12 weeks on 6/2018     - last injection Eylea 5/8/20   - OCT no sig fluid   - VA stable   - observe today      #  OS   - mild fluid, choroid thick for age   - doubt CNVM      # PVD OU   - advised S/Sx RD 5/2022      # borderline DM no DR   - pt lost weight and has normal blood sugars now      return to clinic:  3-4  month  OCT OU, DFE each eye    Moise Beaver MD  Vitreoretinal Surgical Fellow, PGY6  [facility]          ATTESTATION     Attending Attestation:     Complete documentation of historical and exam elements from today's encounter can be found in the full encounter summary report (not reduplicated in this progress note).  I personally obtained the chief complaint(s) and history of present illness.  I confirmed and edited as necessary the review of systems, past medical/surgical history, family history, social history, and examination findings as documented by others; and I examined the patient myself.  I personally reviewed the relevant tests, images, and reports as documented above.  I personally reviewed the ophthalmic test(s) associated with this encounter, agree with the interpretation(s) as documented by the resident/fellow, and have edited the corresponding report(s) as necessary.   I formulated and edited as necessary the assessment and plan and discussed the findings and management plan with the patient and family    Sondra Reyes MD, PhD  , Vitreoretinal Surgery  Department of Ophthalmology  [facility]

## 2022-08-29 NOTE — NURSING NOTE
Chief Complaints and History of Present Illnesses   Patient presents with     Exudative Macular Degeneration Follow Up     Chief Complaint(s) and History of Present Illness(es)     Exudative Macular Degeneration Follow Up     Laterality: left eye    Severity: severe    Frequency: constantly    Associated symptoms: Negative for eye pain    Pain scale: 0/10              Comments     Exudative age-related macular degeneration of left eye with active choroidal neovascularization. She states no change since last visit    Asael Thornton COT 7:34 AM August 29, 2022

## 2022-11-15 DIAGNOSIS — H35.3221 EXUDATIVE AGE-RELATED MACULAR DEGENERATION OF LEFT EYE WITH ACTIVE CHOROIDAL NEOVASCULARIZATION (H): Primary | ICD-10-CM

## 2025-02-10 ENCOUNTER — TELEPHONE (OUTPATIENT)
Dept: OPHTHALMOLOGY | Facility: CLINIC | Age: 70
End: 2025-02-10
Payer: COMMERCIAL

## 2025-02-10 NOTE — TELEPHONE ENCOUNTER
[facility] Call Center    Phone Message    May a detailed message be left on voicemail: yes     Reason for Call: Other: Patient called regarding scheduling, stating she spoke with a nurse to schedule for tomorrow. Writer did not see a message from a nurse, just wanting to make sure visit is appropriately scheduled. Please advise.      Action Taken: Message routed to:  [facility] eye    Travel Screening: Not Applicable

## 2025-02-10 NOTE — TELEPHONE ENCOUNTER
[facility] Call [facility]    Phone Message    May a detailed message be left on voicemail: yes     Reason for Call: Symptoms or Concerns     If patient has red-flag symptoms, warm transfer to triage line    Current symptom or concern: Reading is wavy, but watching TV is not.    Symptoms have been present for:  2 day(s)    Has patient previously been seen for this? No    By :     Date:     Are there any new or worsening symptoms? No    Action Taken: Message routed to:  [facility] Ophthalmology    Travel Screening: Not Applicable     Date of Service:

## 2025-02-10 NOTE — TELEPHONE ENCOUNTER
"Phone:   M: [phone]     Pt noticing a difference in vision since about Saturday. States that \"last night when driving home, noticed that the road looked wavy\". States she was instructed to call with any changes in vision to be seen \"right away\".     No pain, no new floaters or new flashes.     Pt offered appointment for 2/11/25 at 07:40 or 14:40 and will call back to confirm appt when she is able to secure transportation. Also stated that symptoms are improving and may wait for scheduled appt on 2/26/25 at [facility] -Sees Dr. Reyes at [facility] on Wednesdays.             "

## 2025-02-11 ENCOUNTER — OFFICE VISIT (OUTPATIENT)
Dept: OPHTHALMOLOGY | Facility: CLINIC | Age: 70
End: 2025-02-11
Attending: OPHTHALMOLOGY
Payer: COMMERCIAL

## 2025-02-11 DIAGNOSIS — H35.3221 EXUDATIVE AGE-RELATED MACULAR DEGENERATION OF LEFT EYE WITH ACTIVE CHOROIDAL NEOVASCULARIZATION (H): Primary | ICD-10-CM

## 2025-02-11 PROCEDURE — 92134 CPTRZ OPH DX IMG PST SGM RTA: CPT | Performed by: OPHTHALMOLOGY

## 2025-02-11 PROCEDURE — 250N000011 HC RX IP 250 OP 636: Performed by: OPHTHALMOLOGY

## 2025-02-11 PROCEDURE — 67028 INJECTION EYE DRUG: CPT | Mod: LT | Performed by: OPHTHALMOLOGY

## 2025-02-11 PROCEDURE — G0463 HOSPITAL OUTPT CLINIC VISIT: HCPCS | Performed by: OPHTHALMOLOGY

## 2025-02-11 RX ADMIN — LIDOCAINE HYDROCHLORIDE 5 ML: 20 INJECTION, SOLUTION EPIDURAL; INFILTRATION; INTRACAUDAL; PERINEURAL at 16:11

## 2025-02-11 RX ADMIN — Medication 1.25 MG: at 16:11

## 2025-02-11 ASSESSMENT — TONOMETRY
IOP_METHOD: TONOPEN
OS_IOP_MMHG: 13
OD_IOP_MMHG: 14

## 2025-02-11 ASSESSMENT — CONF VISUAL FIELD
OS_SUPERIOR_NASAL_RESTRICTION: 0
METHOD: COUNTING FINGERS
OS_NORMAL: 1
OD_SUPERIOR_NASAL_RESTRICTION: 1
OS_INFERIOR_TEMPORAL_RESTRICTION: 0
OS_INFERIOR_NASAL_RESTRICTION: 0
OS_SUPERIOR_TEMPORAL_RESTRICTION: 0

## 2025-02-11 ASSESSMENT — REFRACTION_WEARINGRX
OD_AXIS: 115
OD_CYLINDER: +1.00
OS_CYLINDER: SPHERE
OS_SPHERE: -2.50
SPECS_TYPE: PAL
OD_SPHERE: PLANO

## 2025-02-11 ASSESSMENT — VISUAL ACUITY
OD_CC: 20/125
OS_CC: 20/25
METHOD: SNELLEN - LINEAR
CORRECTION_TYPE: GLASSES

## 2025-02-11 ASSESSMENT — SLIT LAMP EXAM - LIDS
COMMENTS: NORMAL
COMMENTS: NORMAL

## 2025-02-11 ASSESSMENT — CUP TO DISC RATIO
OS_RATIO: 0.1
OD_RATIO: 0.1

## 2025-02-11 ASSESSMENT — EXTERNAL EXAM - RIGHT EYE: OD_EXAM: NORMAL

## 2025-02-11 ASSESSMENT — EXTERNAL EXAM - LEFT EYE: OS_EXAM: NORMAL

## 2025-02-11 NOTE — NURSING NOTE
"Chief Complaints and History of Present Illnesses   Patient presents with    Vision Changes Od     New Waviness ?OU     Chief Complaint(s) and History of Present Illness(es)       Vision Changes Od              Laterality: both eyes    Associated symptoms: Negative for dryness, eye pain, flashes, floaters, itching, discharge and burning    Treatments tried: artificial tears and glasses    Pain scale: 0/10    Comments: New Waviness ?OU              Comments    Patient reports \"new waviness\" unsure which eye, and improved since then.  Patient reports no change on amsler grid.   Ocular: Systaine RAMON Osborne OA 2:36 PM February 11, 2025                      "

## 2025-02-11 NOTE — PROGRESS NOTES
"CC - CMM OD / wet AMD OS    INTERVAL HISTORY - NKECHI 1/15/25 with me at [facility].  Patient reports \"new waviness\" unsure which eye, and improved since then. Patient reports no change on amsler grid. Ocular: Systaine PRN. Was to be seen for follow up 2/26 but since she had these vision changes she comes in today acutely. First noted 2/7/2025 - has improved since then.    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 She was following with Dr Tipton from [facility] for  of the left eye. She was receiving intravitreal injections of Eylea OD for radiation retinopathy/CME stopped in 2020 d/t end stage    Borderline DM  + subconj lidocaine    PAST OCULAR SURGERY  I-125 OD 2000  CE/IOL OD    RETINAL IMAGING:  OCT mac 02/11/25   OD - Severe central atrophy, elevated CMM remnant temporal with nunu-like opacities, no SRF, PVD, IRF present central  OS - PP FVPED  SRF, new SRF IN parafovea, PVD - worse    U/S OD   A-scan - no reliable A-scan  B-scan - thin scar, 1.31 x ? (6/18/15) -> 1.24mm x 8.76T x 10.84 (12/8/20) ->1.60 mm (7/5/23 @ [facility])    FA 12-8-20  OD - WD and staining of scar  OS - (transit) mild leakage from nasal scar    ICG 12-8-20  OD - filling defect/blockage from scar  OS - likely polyp    OCT-A 8-13-21  OS - suspicious for polyp, no change from 12/2020    ASSESSMENT & PLAN    #. 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  - OCT-A & ICG 12/8/20 c/w polyp  - see note from 5/2024 for details  - fluctuating fluid initially observed by Saeed starting 5/2020  - worsening OCT active FVPED with mild SRF 5/8/2024 @ [facility] started Avastin  - decided to do PRN after first injection at 6/5/24 visit @ [facility]  - new recurrence at 5 months Avastin on 10/23/24    - Last injection Avastin (#2) 10/23/24 (16 weeks) @ [facility]  - OCT with new nasal parafoveal SRF; exam with two new IRH parafoveal  - VA stable but " subjective distortion   - give Avastin today   - given recurrences advise T&E start q12 weeks   - next injection 12 weeks @ [facility] T&E      #. PPCNVM OD  - new Dx 7/5/23  - unclear signfiicance given prior radiation scarring   - Avastin started 7/5/23    - last injection Avastin (#1) 7/5/23  - 02/11/25 new IRF today, given uncertain vision significance can observe; consider treating in future  - monitor    #. CMM OD  -s/p I-125 2000  - likely inactive  - last U/S 7/5/23 @ [facility] larger than [facility] likely d/t different equipement,   - appears stable on Optos photos  - repeat q2-3 years (~ 2026) at [facility] if possible    #. Radiation retinopathy OD with CME  - severe CME 2011 with VA CF@1'  - s/p PST 2013, Lucentis, Avastin, Eylea  - avoiding steroids d/t  OS  - last injection for radiation was Eylea 5/8/20  - 02/11/25 OCT with new IRF OD; VA stable  - monitor given NVS likely  - observe today    #.  OS  - mild fluid, choroid thick for age    #. PVD OU  - advised S/Sx RD 1/2025    #. borderline DM no DR  - pt lost weight and has normal blood sugars now (11/2023)      RTC 12 weeks at [facility] for Avastion OS, DFE OU, OCT OU    Megha Zuinga MD  Vitreoretinal Surgery Fellow     ATTESTATION     Attending Physician Attestation:      Complete documentation of historical and exam elements from today's encounter can be found in the full encounter summary report (not reduplicated in this progress note).  I personally obtained the chief complaint(s) and history of present illness.  I confirmed and edited as necessary the review of systems, past medical/surgical history, family history, social history, and examination findings as documented by others; and I examined the patient myself.  I personally reviewed the relevant tests, images, and reports as documented above.  I personally reviewed the ophthalmic test(s) associated with this encounter, agree with the interpretation(s) as documented by the resident/fellow, and have edited the  corresponding report(s) as necessary.   I formulated and edited as necessary the assessment and plan and discussed the findings and management plan with the patient and family and No resident or fellow assisted with the procedures performed.  I performed the procedures myself.    Sondra Reyes MD, PhD  , Vitreoretinal Surgery  Department of Ophthalmology  [facility]